# Patient Record
Sex: FEMALE | Race: WHITE | NOT HISPANIC OR LATINO | URBAN - METROPOLITAN AREA
[De-identification: names, ages, dates, MRNs, and addresses within clinical notes are randomized per-mention and may not be internally consistent; named-entity substitution may affect disease eponyms.]

---

## 2017-07-24 NOTE — PATIENT DISCUSSION
Recommended artificial tears to use: 1 drop 4x a day in both eyes.  Systane Balance recommended with sample given.

## 2017-09-08 ENCOUNTER — IMPORTED ENCOUNTER (OUTPATIENT)
Dept: URBAN - METROPOLITAN AREA CLINIC 50 | Facility: CLINIC | Age: 82
End: 2017-09-08

## 2018-08-28 ENCOUNTER — IMPORTED ENCOUNTER (OUTPATIENT)
Dept: URBAN - METROPOLITAN AREA CLINIC 50 | Facility: CLINIC | Age: 83
End: 2018-08-28

## 2018-10-08 ENCOUNTER — IMPORTED ENCOUNTER (OUTPATIENT)
Dept: URBAN - METROPOLITAN AREA CLINIC 50 | Facility: CLINIC | Age: 83
End: 2018-10-08

## 2021-04-17 ASSESSMENT — VISUAL ACUITY
OS_CC: J1@ 13 IN
OD_CC: 20/40
OD_CC: J1@ 13 IN
OS_CC: 20/40+2

## 2021-04-17 ASSESSMENT — TONOMETRY
OS_IOP_MMHG: 19
OD_IOP_MMHG: 19

## 2021-05-25 ENCOUNTER — PREPPED CHART (OUTPATIENT)
Dept: URBAN - METROPOLITAN AREA CLINIC 49 | Facility: CLINIC | Age: 86
End: 2021-05-25

## 2021-05-26 ENCOUNTER — COMPREHENSIVE EXAM (OUTPATIENT)
Dept: URBAN - METROPOLITAN AREA CLINIC 49 | Facility: CLINIC | Age: 86
End: 2021-05-26

## 2021-05-26 DIAGNOSIS — H43.813: ICD-10-CM

## 2021-05-26 DIAGNOSIS — H52.4: ICD-10-CM

## 2021-05-26 PROCEDURE — 92014 COMPRE OPH EXAM EST PT 1/>: CPT

## 2021-05-26 PROCEDURE — 92015 DETERMINE REFRACTIVE STATE: CPT

## 2021-05-26 ASSESSMENT — TONOMETRY
OD_IOP_MMHG: 18
OS_IOP_MMHG: 19

## 2021-05-26 ASSESSMENT — VISUAL ACUITY
OD_CC: 20/30
OS_CC: 20/30-2
OU_CC: J1+

## 2021-05-26 NOTE — PATIENT DISCUSSION
Patient states she is bothered by bump on her left lower lid. Discussed with patient that if bothered can remove in office.

## 2021-05-26 NOTE — PATIENT DISCUSSION
Discussed with patient that is will remove flush with skin. Advised if pathology comes back as positive, will have to take her to the surgery center.

## 2022-02-17 ENCOUNTER — ESTABLISHED PATIENT (OUTPATIENT)
Dept: URBAN - METROPOLITAN AREA CLINIC 49 | Facility: CLINIC | Age: 87
End: 2022-02-17

## 2022-02-17 DIAGNOSIS — J32.0: ICD-10-CM

## 2022-02-17 DIAGNOSIS — H04.321: ICD-10-CM

## 2022-02-17 PROCEDURE — 92012 INTRM OPH EXAM EST PATIENT: CPT

## 2022-02-17 RX ORDER — TOBRAMYCIN AND DEXAMETHASONE 1; 3 MG/ML; MG/ML
1 SUSPENSION/ DROPS OPHTHALMIC
Start: 2022-02-17

## 2022-02-17 ASSESSMENT — TONOMETRY
OS_IOP_MMHG: 18
OD_IOP_MMHG: 18

## 2022-02-17 ASSESSMENT — VISUAL ACUITY
OD_CC: 20/30
OS_CC: 20/30

## 2022-02-17 NOTE — PATIENT DISCUSSION
Discussed with patient,  she is having fullness feeling in right nostril as well as sinus pressure on the right side.  Discussed with if it is sinus problems it will crowd the lacrimal system and cause tearing.  Recommend ct scan of orbit.

## 2022-02-17 NOTE — PATIENT DISCUSSION
Discussed with patient,  she is having fullness feeling in right nostril as well as sinus pressure on the right side.  Discussed with if it is sinus problems it will crowd the lacrimal system and cause tearing.  Recommend ct scan of orbit. order given to patient.  will see patient in 2 weeks for irrigation.  also recommend patient start tobradex 4 times a day for 7 days.

## 2022-03-02 ENCOUNTER — FOLLOW UP (OUTPATIENT)
Dept: URBAN - METROPOLITAN AREA CLINIC 49 | Facility: CLINIC | Age: 87
End: 2022-03-02

## 2022-03-02 DIAGNOSIS — H35.373: ICD-10-CM

## 2022-03-02 DIAGNOSIS — H02.883: ICD-10-CM

## 2022-03-02 DIAGNOSIS — J32.0: ICD-10-CM

## 2022-03-02 DIAGNOSIS — H52.4: ICD-10-CM

## 2022-03-02 DIAGNOSIS — H43.813: ICD-10-CM

## 2022-03-02 DIAGNOSIS — H02.886: ICD-10-CM

## 2022-03-02 DIAGNOSIS — H04.321: ICD-10-CM

## 2022-03-02 PROCEDURE — 92012 INTRM OPH EXAM EST PATIENT: CPT

## 2022-03-02 ASSESSMENT — TONOMETRY
OS_IOP_MMHG: 18
OD_IOP_MMHG: 18

## 2022-03-02 ASSESSMENT — VISUAL ACUITY
OS_CC: 20/30+2
OD_CC: 20/30+2

## 2022-03-02 NOTE — PATIENT DISCUSSION
Rediscussed with patient,  she is having fullness feeling in right nostril as well as sinus pressure on the right side.  Rediscussed with if it is sinus problems it will crowd the lacrimal system and cause tearing.  Rerecommended ct scan of orbit.  patient has the order already and will call to schedule.

## 2022-03-02 NOTE — PATIENT DISCUSSION
recommend patient increase her prednisone 2.5mg to 20 mg once a day for 4 days only then reduce back to 2.5 mg.

## 2022-03-02 NOTE — PATIENT DISCUSSION
Discussed with patient,  that she is having fullness feeling in right nostril as well as sinus pressure on the right side.  Rediscussed if it is sinus problems it will crowd the lacrimal system and cause tearing.  ReRecommend ct scan of orbit. order was given to patient at last visit.    patient will call to schedule.  will see patient in 2 weeks.  also recommend patient restart tobradex 4 times a day for 7 days.

## 2022-11-29 ENCOUNTER — ESTABLISHED PATIENT (OUTPATIENT)
Dept: URBAN - METROPOLITAN AREA CLINIC 48 | Facility: LOCATION | Age: 87
End: 2022-11-29

## 2022-11-29 DIAGNOSIS — H10.45: ICD-10-CM

## 2022-11-29 DIAGNOSIS — H43.813: ICD-10-CM

## 2022-11-29 PROCEDURE — 92012 INTRM OPH EXAM EST PATIENT: CPT

## 2022-11-29 RX ORDER — TOBRAMYCIN AND DEXAMETHASONE 1; 3 MG/ML; MG/ML: 1 SUSPENSION/ DROPS OPHTHALMIC

## 2022-11-29 ASSESSMENT — VISUAL ACUITY
OS_CC: 20/40
OD_CC: 20/30-1

## 2022-11-29 ASSESSMENT — TONOMETRY
OD_IOP_MMHG: 17
OS_IOP_MMHG: 18

## 2024-05-29 ENCOUNTER — COMPREHENSIVE EXAM (OUTPATIENT)
Dept: URBAN - METROPOLITAN AREA CLINIC 49 | Facility: LOCATION | Age: 89
End: 2024-05-29

## 2024-05-29 DIAGNOSIS — H10.45: ICD-10-CM

## 2024-05-29 DIAGNOSIS — H43.813: ICD-10-CM

## 2024-05-29 DIAGNOSIS — H35.373: ICD-10-CM

## 2024-05-29 DIAGNOSIS — J32.0: ICD-10-CM

## 2024-05-29 DIAGNOSIS — H52.4: ICD-10-CM

## 2024-05-29 DIAGNOSIS — H02.052: ICD-10-CM

## 2024-05-29 PROCEDURE — 67820 REVISE EYELASHES: CPT

## 2024-05-29 PROCEDURE — 99214 OFFICE O/P EST MOD 30 MIN: CPT | Mod: 25

## 2024-05-29 PROCEDURE — 92134 CPTRZ OPH DX IMG PST SGM RTA: CPT

## 2024-05-29 PROCEDURE — 92015 DETERMINE REFRACTIVE STATE: CPT

## 2024-05-29 RX ORDER — NEOMYCIN SULFATE, POLYMYXIN B SULFATE AND DEXAMETHASONE 3.5; 10000; 1 MG/ML; [USP'U]/ML; MG/ML
1 SUSPENSION OPHTHALMIC
Start: 2024-05-29 | End: 2024-06-08

## 2024-05-29 RX ORDER — ERYTHROMYCIN 5 MG/G
OINTMENT OPHTHALMIC EVERY EVENING
Start: 2024-05-29 | End: 2024-06-08

## 2024-05-29 ASSESSMENT — VISUAL ACUITY
OS_CC: 20/40
OD_CC: 20/50
OD_PH: 20/30
OU_CC: J1 @ 16"

## 2024-05-29 ASSESSMENT — TONOMETRY
OS_IOP_MMHG: 18
OD_IOP_MMHG: 18

## 2024-06-19 ENCOUNTER — FOLLOW UP (OUTPATIENT)
Dept: URBAN - METROPOLITAN AREA CLINIC 49 | Facility: LOCATION | Age: 89
End: 2024-06-19

## 2024-06-19 DIAGNOSIS — H04.411: ICD-10-CM

## 2024-06-19 DIAGNOSIS — H04.123: ICD-10-CM

## 2024-06-19 PROCEDURE — 99213 OFFICE O/P EST LOW 20 MIN: CPT

## 2024-06-19 RX ORDER — ERYTHROMYCIN 5 MG/G
OINTMENT OPHTHALMIC EVERY EVENING
Start: 2024-06-19

## 2024-06-19 RX ORDER — NEOMYCIN SULFATE, POLYMYXIN B SULFATE AND DEXAMETHASONE 3.5; 10000; 1 MG/ML; [USP'U]/ML; MG/ML
1 SUSPENSION OPHTHALMIC
Start: 2024-06-19

## 2024-06-19 ASSESSMENT — VISUAL ACUITY
OD_CC: 20/30-2
OS_CC: 20/30-2

## 2024-06-19 ASSESSMENT — TONOMETRY
OD_IOP_MMHG: 15
OS_IOP_MMHG: 15